# Patient Record
Sex: MALE | Race: OTHER
[De-identification: names, ages, dates, MRNs, and addresses within clinical notes are randomized per-mention and may not be internally consistent; named-entity substitution may affect disease eponyms.]

---

## 2022-11-23 ENCOUNTER — HOSPITAL ENCOUNTER (EMERGENCY)
Dept: HOSPITAL 54 - ER | Age: 57
Discharge: HOME | End: 2022-11-23
Payer: COMMERCIAL

## 2022-11-23 VITALS — DIASTOLIC BLOOD PRESSURE: 72 MMHG | SYSTOLIC BLOOD PRESSURE: 123 MMHG

## 2022-11-23 VITALS — BODY MASS INDEX: 20.04 KG/M2 | WEIGHT: 140 LBS | HEIGHT: 70 IN

## 2022-11-23 DIAGNOSIS — R10.84: Primary | ICD-10-CM

## 2022-11-23 DIAGNOSIS — Z59.00: ICD-10-CM

## 2022-11-23 DIAGNOSIS — R11.2: ICD-10-CM

## 2022-11-23 SDOH — ECONOMIC STABILITY - HOUSING INSECURITY: HOMELESSNESS UNSPECIFIED: Z59.00

## 2022-11-23 NOTE — NUR
BIBRA 78 FOR C/O N/V/D FOR THE PAST FEW HOURS. PT WAS AXO4. VSS. TOLERATIN RA 
WITH NO RESP DISTRESS

## 2022-11-23 NOTE — NUR
Patient discharged to home in stable condition. Written and verbal after care 
instructions given. Patient verbalizes understanding of instruction.PT 
ambulatory with a steady gait